# Patient Record
Sex: FEMALE | Race: WHITE | URBAN - METROPOLITAN AREA
[De-identification: names, ages, dates, MRNs, and addresses within clinical notes are randomized per-mention and may not be internally consistent; named-entity substitution may affect disease eponyms.]

---

## 2019-08-20 ENCOUNTER — Encounter (OUTPATIENT)
Dept: URBAN - METROPOLITAN AREA CLINIC 71 | Facility: CLINIC | Age: 64
End: 2019-08-20
Payer: COMMERCIAL

## 2019-08-20 PROCEDURE — 92004 COMPRE OPH EXAM NEW PT 1/>: CPT | Performed by: OPTOMETRIST

## 2019-08-20 PROCEDURE — 92310 CONTACT LENS FITTING OU: CPT | Performed by: OPTOMETRIST

## 2020-11-12 ENCOUNTER — OFFICE VISIT (OUTPATIENT)
Dept: URBAN - METROPOLITAN AREA CLINIC 71 | Facility: CLINIC | Age: 65
End: 2020-11-12
Payer: COMMERCIAL

## 2020-11-12 DIAGNOSIS — H25.13 AGE-RELATED NUCLEAR CATARACT, BILATERAL: ICD-10-CM

## 2020-11-12 DIAGNOSIS — H04.123 DRY EYE SYNDROME OF BILATERAL LACRIMAL GLANDS: ICD-10-CM

## 2020-11-12 PROCEDURE — 92014 COMPRE OPH EXAM EST PT 1/>: CPT | Performed by: OPTOMETRIST

## 2020-11-12 PROCEDURE — 92310 CONTACT LENS FITTING OU: CPT | Performed by: OPTOMETRIST

## 2020-11-12 ASSESSMENT — KERATOMETRY
OS: 41.75
OD: 41.63

## 2020-11-12 ASSESSMENT — VISUAL ACUITY
OD: 20/30
OS: 20/20

## 2020-11-12 ASSESSMENT — INTRAOCULAR PRESSURE
OS: 21
OD: 20

## 2020-11-12 NOTE — IMPRESSION/PLAN
Impression: Dry eye syndrome of bilateral lacrimal glands: H04.123. Plan: Dry eye syndrome requires lubrication of the eye with artificial tears and nighttime ointments or gels. In addition, topical and oral anti-inflammatory medications are usually necessary to treat the associated ocular irritation. Contact the office if dry eye does not improve, worsens or blurs vision. Recommend 3-4 drops daily of Systane Balance.

## 2020-11-12 NOTE — IMPRESSION/PLAN
Impression: Age-related nuclear cataract, bilateral: H25.13. Stable. observe until vision/glare worsens Plan: Cataracts do not require treatment unless they interfere with vision and impact one's activities of daily living, in which case cataract extraction with lens implant insertion should be performed. Cataracts occur in everyone as they age. Contact the office if you experience progressive loss of vision, increasing glare, and problems with daily activities such as driving, reading, watching tv, seeing street signs, or following a golf ball.

## 2020-11-12 NOTE — IMPRESSION/PLAN
Impression: Presbyopia: H52.4. Bilateral.

New glasses and CTL RX given today Plan: Presbyopia is the inability to focus on objects (ie: accommodate) due to the loss of flexibility of your natural lens. Presbyopia occurs with age. Reading glasses, bifocals, trifocals or contacts can be helpful. Contact the office if difficulty focusing persists despite corrective eye wear. Astigmatism causes blurred vision due to either an irregularly shaped cornea or the curvature of the lens. Small amounts of astigmatism do not need to be treated, but larger amounts can cause visual distortion, blurred vision, eye strain and headaches.

## 2021-11-16 ENCOUNTER — OFFICE VISIT (OUTPATIENT)
Dept: URBAN - METROPOLITAN AREA CLINIC 71 | Facility: CLINIC | Age: 66
End: 2021-11-16
Payer: COMMERCIAL

## 2021-11-16 DIAGNOSIS — H52.4 PRESBYOPIA: Primary | ICD-10-CM

## 2021-11-16 DIAGNOSIS — H40.052 OCULAR HYPERTENSION, LEFT EYE: ICD-10-CM

## 2021-11-16 PROCEDURE — 92014 COMPRE OPH EXAM EST PT 1/>: CPT | Performed by: OPTOMETRIST

## 2021-11-16 PROCEDURE — 92310 CONTACT LENS FITTING OU: CPT | Performed by: OPTOMETRIST

## 2021-11-16 ASSESSMENT — INTRAOCULAR PRESSURE
OS: 22
OD: 16

## 2021-11-16 ASSESSMENT — VISUAL ACUITY
OD: 20/30
OS: 20/20

## 2021-11-16 NOTE — IMPRESSION/PLAN
Impression: Presbyopia: H52.4. Plan: Presbyopia is the inability to focus on objects (ie: accommodate) due to the loss of flexibility of your natural lens. Presbyopia occurs with age. Reading glasses, bifocals, trifocals or contacts can be helpful. Contact the office if difficulty focusing persists despite corrective eye wear. New glasses and CTL RX given today. MONO vision CTL OD distance and OS near.

## 2021-11-16 NOTE — IMPRESSION/PLAN
Impression: Ocular hypertension, left eye: H40.052. Plan: Family HX of glaucoma with her father. Discussed Ocular hypertension is elevated intraocluar pressure that may cause glaucoma and require treatment. Not every patient with ocular hypertension needs treatment. Ocular hypertension may need to be treated with glaucoma eye drops. No treatment needed at this time. There is increased cupping on OS today.  Will have PT RTC in 1 months for baseline DFE w/ a VF 24-2 w/ OCT and PACHY

## 2022-02-24 ENCOUNTER — OFFICE VISIT (OUTPATIENT)
Dept: URBAN - METROPOLITAN AREA CLINIC 71 | Facility: CLINIC | Age: 67
End: 2022-02-24
Payer: COMMERCIAL

## 2022-02-24 DIAGNOSIS — H40.1131 PRIMARY OPEN-ANGLE GLAUCOMA, BILATERAL, MILD STAGE: Primary | ICD-10-CM

## 2022-02-24 DIAGNOSIS — H25.813 COMBINED FORMS OF AGE-RELATED CATARACT, BILATERAL: ICD-10-CM

## 2022-02-24 DIAGNOSIS — H35.373 PUCKERING OF MACULA, BILATERAL: ICD-10-CM

## 2022-02-24 PROCEDURE — 92083 EXTENDED VISUAL FIELD XM: CPT | Performed by: OPTOMETRIST

## 2022-02-24 PROCEDURE — 92133 CPTRZD OPH DX IMG PST SGM ON: CPT | Performed by: OPTOMETRIST

## 2022-02-24 PROCEDURE — 99214 OFFICE O/P EST MOD 30 MIN: CPT | Performed by: OPTOMETRIST

## 2022-02-24 PROCEDURE — 76514 ECHO EXAM OF EYE THICKNESS: CPT | Performed by: OPTOMETRIST

## 2022-02-24 RX ORDER — LATANOPROST 50 UG/ML
0.005 % SOLUTION OPHTHALMIC
Qty: 7.5 | Refills: 6 | Status: ACTIVE
Start: 2022-02-24

## 2022-02-24 ASSESSMENT — INTRAOCULAR PRESSURE
OS: 22
OD: 17
OD: 22
OS: 19

## 2022-02-24 NOTE — IMPRESSION/PLAN
Impression: Combined forms of age-related cataract, bilateral: H25.813. Plan: Cataracts are moderate but PT notes her vision does not bother her at this time. No treatment currently recommended. The patient will monitor vision changes and contact us with any decrease in vision.

## 2022-02-24 NOTE — IMPRESSION/PLAN
Impression: Primary open-angle glaucoma, bilateral, mild stage: H40.1131. Plan: Discussed diagnosis with the PT. OCT done today shows RNFL thinning OU at 55/69 Due to the RNFL thinning OU, Thin corneas OU and positive family HX recommend starting Latanoprost QPM OU. 
Will recheck the IOP in 1 month

## 2022-02-24 NOTE — IMPRESSION/PLAN
Impression: Puckering of macula, bilateral: H35.373. Plan: Trace ERM noted on exam and OCT Today. Not interfering with her vision.  Will continue to monitor

## 2022-03-30 ENCOUNTER — OFFICE VISIT (OUTPATIENT)
Dept: URBAN - METROPOLITAN AREA CLINIC 71 | Facility: CLINIC | Age: 67
End: 2022-03-30
Payer: COMMERCIAL

## 2022-03-30 PROCEDURE — 99212 OFFICE O/P EST SF 10 MIN: CPT | Performed by: OPTOMETRIST

## 2022-03-30 ASSESSMENT — INTRAOCULAR PRESSURE
OD: 15
OS: 18

## 2022-03-30 NOTE — IMPRESSION/PLAN
Impression: Primary open-angle glaucoma, bilateral, mild stage: H40.1131. Plan: IOP has improved with the addition of Latanoprost.
Discussed PT can use artificial tears 3x a day 10 minutes after the Latanoprost.
Will continue to monitor in 4 months due to family HX of glaucoma with her father.

## 2022-07-28 ENCOUNTER — OFFICE VISIT (OUTPATIENT)
Dept: URBAN - METROPOLITAN AREA CLINIC 71 | Facility: CLINIC | Age: 67
End: 2022-07-28
Payer: COMMERCIAL

## 2022-07-28 DIAGNOSIS — H40.1131 PRIMARY OPEN-ANGLE GLAUCOMA, BILATERAL, MILD STAGE: Primary | ICD-10-CM

## 2022-07-28 DIAGNOSIS — H25.813 COMBINED FORMS OF AGE-RELATED CATARACT, BILATERAL: ICD-10-CM

## 2022-07-28 PROCEDURE — 92133 CPTRZD OPH DX IMG PST SGM ON: CPT | Performed by: OPTOMETRIST

## 2022-07-28 PROCEDURE — 99212 OFFICE O/P EST SF 10 MIN: CPT | Performed by: OPTOMETRIST

## 2022-07-28 ASSESSMENT — INTRAOCULAR PRESSURE
OS: 15
OD: 13

## 2022-07-28 NOTE — IMPRESSION/PLAN
Impression: Primary open-angle glaucoma, bilateral, mild stage: H40.1131. Plan: OCT ordered and reviewed today RNFL remaining stable OU at 76/73 compared to scan from 02/22. IOP remaining stable on current treatment plan. No changes needed at this time. Continue the Latanoprost QPM OU.

## 2022-11-29 ENCOUNTER — OFFICE VISIT (OUTPATIENT)
Dept: URBAN - METROPOLITAN AREA CLINIC 71 | Facility: CLINIC | Age: 67
End: 2022-11-29
Payer: COMMERCIAL

## 2022-11-29 DIAGNOSIS — H40.1131 PRIMARY OPEN-ANGLE GLAUCOMA, BILATERAL, MILD STAGE: Primary | ICD-10-CM

## 2022-11-29 DIAGNOSIS — H25.813 COMBINED FORMS OF AGE-RELATED CATARACT, BILATERAL: ICD-10-CM

## 2022-11-29 PROCEDURE — 99214 OFFICE O/P EST MOD 30 MIN: CPT | Performed by: OPTOMETRIST

## 2022-11-29 PROCEDURE — 92133 CPTRZD OPH DX IMG PST SGM ON: CPT | Performed by: OPTOMETRIST

## 2022-11-29 RX ORDER — LATANOPROST 50 UG/ML
0.005 % SOLUTION OPHTHALMIC
Qty: 7.5 | Refills: 6 | Status: ACTIVE
Start: 2022-11-29

## 2022-11-29 ASSESSMENT — INTRAOCULAR PRESSURE
OD: 15
OS: 18
OS: 17
OD: 14

## 2022-11-29 NOTE — IMPRESSION/PLAN
Impression: Combined forms of age-related cataract, bilateral: H25.813. Plan: Cataracts are stable and not interfering with her vision. No treatment currently recommended. The patient will monitor vision changes and contact us with any decrease in vision. PT returning next week for an updated REF.

## 2022-11-29 NOTE — IMPRESSION/PLAN
Impression: Primary open-angle glaucoma, bilateral, mild stage: H40.1131. Plan: OCT ordered and reviewed today. RNFL remaining stable OU compared to scan from 2/2022. Last Pachy 2/2022 518/531. IOP is remaining stable on current treatment plan. No progression noted today. Continue the Latanoprost QPM OU.  
Will monitor in 3-4 months with VF 24-2 w/ OPTOS

## 2022-12-20 ENCOUNTER — OFFICE VISIT (OUTPATIENT)
Dept: URBAN - METROPOLITAN AREA CLINIC 71 | Facility: CLINIC | Age: 67
End: 2022-12-20
Payer: COMMERCIAL

## 2022-12-20 DIAGNOSIS — H40.1131 PRIMARY OPEN-ANGLE GLAUCOMA, BILATERAL, MILD STAGE: ICD-10-CM

## 2022-12-20 DIAGNOSIS — H52.4 PRESBYOPIA: Primary | ICD-10-CM

## 2022-12-20 DIAGNOSIS — H25.813 COMBINED FORMS OF AGE-RELATED CATARACT, BILATERAL: ICD-10-CM

## 2022-12-20 PROCEDURE — 92014 COMPRE OPH EXAM EST PT 1/>: CPT | Performed by: OPTOMETRIST

## 2022-12-20 PROCEDURE — 92310 CONTACT LENS FITTING OU: CPT | Performed by: OPTOMETRIST

## 2022-12-20 ASSESSMENT — VISUAL ACUITY
OD: 20/30
OS: 20/30

## 2022-12-20 ASSESSMENT — INTRAOCULAR PRESSURE
OS: 17
OD: 14

## 2022-12-20 NOTE — IMPRESSION/PLAN
Impression: Primary open-angle glaucoma, bilateral, mild stage: H40.1131. Plan: IOP is remaining stable OU on current treatment plan. No changes needed at this time. Continue the Latanoprost QPM OU.  
Will continue to follow as planned in 3/2023

## 2022-12-20 NOTE — IMPRESSION/PLAN
Impression: Presbyopia: H52.4. Plan: Presbyopia is the inability to focus on objects (ie: accommodate) due to the loss of flexibility of your natural lens. Presbyopia occurs with age. Reading glasses, bifocals, trifocals or contacts can be helpful. Contact the office if difficulty focusing persists despite corrective eye wear. New glasses and CTL RX given today. Changes in the prescription discussed. PT is a little over corrected in the right eye. At this point in her vision i would recommend stopping with the mono vision in the contacts and use both eyes for distance and use OTC readers over the contacts. Recommend using a +2.50 OTC reader. Continue to follow annually for vision exams.

## 2022-12-20 NOTE — IMPRESSION/PLAN
Impression: Combined forms of age-related cataract, bilateral: H25.813. Plan: Cataracts are progressing but PT reports her vision is not bothering her at this time. No treatment currently recommended. The patient will monitor vision changes and contact us with any decrease in vision.

## 2023-06-27 ENCOUNTER — OFFICE VISIT (OUTPATIENT)
Dept: URBAN - METROPOLITAN AREA CLINIC 71 | Facility: CLINIC | Age: 68
End: 2023-06-27
Payer: COMMERCIAL

## 2023-06-27 DIAGNOSIS — R23.3 SPONTANEOUS ECCHYMOSES: Primary | ICD-10-CM

## 2023-06-27 PROCEDURE — 99212 OFFICE O/P EST SF 10 MIN: CPT

## 2023-06-27 ASSESSMENT — INTRAOCULAR PRESSURE
OD: 18
OS: 18

## 2023-06-27 NOTE — IMPRESSION/PLAN
Impression: Spontaneous ecchymoses: R23.3.

 -- Patient fell on the ground when she was outside and hit her left eye globe area and left marked ecchymoses and a laceration on upper eye brow lateral
 -- Patient denied any diplopia (EOMs performed, no restriction noted)
 -- (-) crepitus -- OPTOS performed: (-) tears/detachments Plan: Discussed diagnosis. Explained that she had a traumatic event to her left eye and that resulted in a laceration and bruising of the left eye globe area. Explained that the bruising will heal with time. Further reassured the patient that the left is doing good. Patient has not gone to the hospital. Recommended (despite the patient feeling good) go to the hospital and have a CT scan of her head done to ensure that there is no fractures from the fall or if anything abnormal is present, then she can get treated. Further instructed patient that because of the traumatic event, there is a risk that a tear or detachment could occur and advised her to watch for any flashing lights, new floaters or curtain in her vision and for her to RTC immediately if any symptoms are experienced. Patient voiced understanding. Instructed patient to follow up with Dr. Ekta Rothman as previously directed.

## 2023-06-29 ENCOUNTER — OFFICE VISIT (OUTPATIENT)
Dept: URBAN - METROPOLITAN AREA CLINIC 71 | Facility: CLINIC | Age: 68
End: 2023-06-29
Payer: COMMERCIAL

## 2023-06-29 DIAGNOSIS — H40.1131 PRIMARY OPEN-ANGLE GLAUCOMA, BILATERAL, MILD STAGE: Primary | ICD-10-CM

## 2023-06-29 DIAGNOSIS — H25.813 COMBINED FORMS OF AGE-RELATED CATARACT, BILATERAL: ICD-10-CM

## 2023-06-29 PROCEDURE — 92133 CPTRZD OPH DX IMG PST SGM ON: CPT | Performed by: OPTOMETRIST

## 2023-06-29 PROCEDURE — 99213 OFFICE O/P EST LOW 20 MIN: CPT | Performed by: OPTOMETRIST

## 2023-06-29 ASSESSMENT — INTRAOCULAR PRESSURE
OS: 18
OD: 16

## 2023-06-29 NOTE — IMPRESSION/PLAN
Impression: Combined forms of age-related cataract, bilateral: H25.813. Cataracts are stable and not interfering with her vision. No treatment currently recommended. Plan: The patient will monitor vision changes and contact us with any decrease in vision.

## 2023-06-29 NOTE — IMPRESSION/PLAN
Impression: Primary open-angle glaucoma, bilateral, mild stage: H40.1131. OCT ordered and reviewed today. RNFL is stable OU poor scan OS today. IOP is remaining stable OU. Plan: No changes to treatment regiment. Continue the Latanoprost QPM OU.  
Will follow in 4 months with another IOP check and OCT

## 2023-08-09 ENCOUNTER — TESTING ONLY (OUTPATIENT)
Dept: URBAN - METROPOLITAN AREA CLINIC 71 | Facility: CLINIC | Age: 68
End: 2023-08-09

## 2023-08-09 DIAGNOSIS — H25.813 COMBINED FORMS OF AGE-RELATED CATARACT, BILATERAL: ICD-10-CM

## 2023-08-09 DIAGNOSIS — H04.123 DRY EYE SYNDROME OF BILATERAL LACRIMAL GLANDS: ICD-10-CM

## 2023-08-09 DIAGNOSIS — H52.4 PRESBYOPIA: Primary | ICD-10-CM

## 2023-08-09 PROCEDURE — 92310 CONTACT LENS FITTING OU: CPT | Performed by: OPTOMETRIST

## 2023-08-09 PROCEDURE — 99213 OFFICE O/P EST LOW 20 MIN: CPT | Performed by: OPTOMETRIST

## 2023-08-09 ASSESSMENT — VISUAL ACUITY
OD: 20/30
OS: 20/30

## 2023-08-09 ASSESSMENT — INTRAOCULAR PRESSURE
OD: 17
OS: 17

## 2023-11-02 ENCOUNTER — OFFICE VISIT (OUTPATIENT)
Dept: URBAN - METROPOLITAN AREA CLINIC 71 | Facility: CLINIC | Age: 68
End: 2023-11-02
Payer: COMMERCIAL

## 2023-11-02 DIAGNOSIS — H25.813 COMBINED FORMS OF AGE-RELATED CATARACT, BILATERAL: ICD-10-CM

## 2023-11-02 DIAGNOSIS — H40.1131 PRIMARY OPEN-ANGLE GLAUCOMA, BILATERAL, MILD STAGE: Primary | ICD-10-CM

## 2023-11-02 PROCEDURE — 92133 CPTRZD OPH DX IMG PST SGM ON: CPT | Performed by: OPTOMETRIST

## 2023-11-02 PROCEDURE — 99213 OFFICE O/P EST LOW 20 MIN: CPT | Performed by: OPTOMETRIST

## 2023-11-02 ASSESSMENT — INTRAOCULAR PRESSURE
OD: 17
OS: 16

## 2024-04-30 ENCOUNTER — OFFICE VISIT (OUTPATIENT)
Dept: URBAN - METROPOLITAN AREA CLINIC 71 | Facility: CLINIC | Age: 69
End: 2024-04-30
Payer: COMMERCIAL

## 2024-04-30 DIAGNOSIS — H40.1131 PRIMARY OPEN-ANGLE GLAUCOMA, BILATERAL, MILD STAGE: Primary | ICD-10-CM

## 2024-04-30 DIAGNOSIS — H35.373 PUCKERING OF MACULA, BILATERAL: ICD-10-CM

## 2024-04-30 PROCEDURE — 92133 CPTRZD OPH DX IMG PST SGM ON: CPT | Performed by: OPTOMETRIST

## 2024-04-30 PROCEDURE — 92083 EXTENDED VISUAL FIELD XM: CPT | Performed by: OPTOMETRIST

## 2024-04-30 PROCEDURE — 99213 OFFICE O/P EST LOW 20 MIN: CPT | Performed by: OPTOMETRIST

## 2024-04-30 PROCEDURE — 92134 CPTRZ OPH DX IMG PST SGM RTA: CPT | Performed by: OPTOMETRIST

## 2024-04-30 ASSESSMENT — INTRAOCULAR PRESSURE
OD: 12
OS: 14

## 2024-08-14 ENCOUNTER — OFFICE VISIT (OUTPATIENT)
Dept: URBAN - METROPOLITAN AREA CLINIC 71 | Facility: CLINIC | Age: 69
End: 2024-08-14
Payer: COMMERCIAL

## 2024-08-14 DIAGNOSIS — H25.813 COMBINED FORMS OF AGE-RELATED CATARACT, BILATERAL: ICD-10-CM

## 2024-08-14 DIAGNOSIS — H52.4 PRESBYOPIA: Primary | ICD-10-CM

## 2024-08-14 PROCEDURE — 92012 INTRM OPH EXAM EST PATIENT: CPT | Performed by: OPTOMETRIST

## 2024-08-14 ASSESSMENT — INTRAOCULAR PRESSURE
OS: 19
OD: 18

## 2024-08-14 ASSESSMENT — VISUAL ACUITY
OS: 20/20
OD: 20/30

## 2024-08-20 ENCOUNTER — OFFICE VISIT (OUTPATIENT)
Dept: URBAN - METROPOLITAN AREA CLINIC 71 | Facility: CLINIC | Age: 69
End: 2024-08-20

## 2024-08-20 DIAGNOSIS — H52.4 PRESBYOPIA: Primary | ICD-10-CM

## 2024-08-20 PROCEDURE — 92310 CONTACT LENS FITTING OU: CPT | Performed by: OPTOMETRIST

## 2024-08-20 ASSESSMENT — INTRAOCULAR PRESSURE
OD: 14
OS: 15

## 2024-09-24 ENCOUNTER — OFFICE VISIT (OUTPATIENT)
Dept: URBAN - METROPOLITAN AREA CLINIC 71 | Facility: CLINIC | Age: 69
End: 2024-09-24
Payer: COMMERCIAL

## 2024-09-24 DIAGNOSIS — H40.1131 PRIMARY OPEN-ANGLE GLAUCOMA, BILATERAL, MILD STAGE: Primary | ICD-10-CM

## 2024-09-24 DIAGNOSIS — H25.13 AGE-RELATED NUCLEAR CATARACT, BILATERAL: ICD-10-CM

## 2024-09-24 PROCEDURE — 92133 CPTRZD OPH DX IMG PST SGM ON: CPT | Performed by: OPTOMETRIST

## 2024-09-24 PROCEDURE — 99213 OFFICE O/P EST LOW 20 MIN: CPT | Performed by: OPTOMETRIST

## 2024-09-24 ASSESSMENT — INTRAOCULAR PRESSURE
OD: 16
OS: 15
OD: 15
OS: 17

## 2025-03-21 ENCOUNTER — OFFICE VISIT (OUTPATIENT)
Dept: URBAN - METROPOLITAN AREA CLINIC 71 | Facility: CLINIC | Age: 70
End: 2025-03-21
Payer: COMMERCIAL

## 2025-03-21 DIAGNOSIS — H40.1131 PRIMARY OPEN-ANGLE GLAUCOMA, BILATERAL, MILD STAGE: Primary | ICD-10-CM

## 2025-03-21 DIAGNOSIS — H35.373 PUCKERING OF MACULA, BILATERAL: ICD-10-CM

## 2025-03-21 DIAGNOSIS — H25.13 AGE-RELATED NUCLEAR CATARACT, BILATERAL: ICD-10-CM

## 2025-03-21 PROCEDURE — 99213 OFFICE O/P EST LOW 20 MIN: CPT

## 2025-03-21 PROCEDURE — 92134 CPTRZ OPH DX IMG PST SGM RTA: CPT

## 2025-03-21 PROCEDURE — 92133 CPTRZD OPH DX IMG PST SGM ON: CPT

## 2025-03-21 PROCEDURE — 92083 EXTENDED VISUAL FIELD XM: CPT

## 2025-03-21 ASSESSMENT — INTRAOCULAR PRESSURE
OS: 16
OD: 14

## 2025-08-15 ENCOUNTER — OFFICE VISIT (OUTPATIENT)
Dept: URBAN - METROPOLITAN AREA CLINIC 71 | Facility: CLINIC | Age: 70
End: 2025-08-15
Payer: COMMERCIAL

## 2025-08-15 DIAGNOSIS — H52.4 PRESBYOPIA: Primary | ICD-10-CM

## 2025-08-15 DIAGNOSIS — H04.123 DRY EYE SYNDROME OF BILATERAL LACRIMAL GLANDS: ICD-10-CM

## 2025-08-15 DIAGNOSIS — H25.813 COMBINED FORMS OF AGE-RELATED CATARACT, BILATERAL: ICD-10-CM

## 2025-08-15 PROCEDURE — 99213 OFFICE O/P EST LOW 20 MIN: CPT

## 2025-08-15 PROCEDURE — 92310 CONTACT LENS FITTING OU: CPT

## 2025-08-15 ASSESSMENT — VISUAL ACUITY
OD: 20/25
OS: 20/20

## 2025-08-15 ASSESSMENT — KERATOMETRY
OD: 39.25
OS: 41.88

## 2025-08-15 ASSESSMENT — INTRAOCULAR PRESSURE
OS: 11
OD: 11